# Patient Record
Sex: MALE | Race: OTHER | NOT HISPANIC OR LATINO
[De-identification: names, ages, dates, MRNs, and addresses within clinical notes are randomized per-mention and may not be internally consistent; named-entity substitution may affect disease eponyms.]

---

## 2019-08-30 PROBLEM — Z00.00 ENCOUNTER FOR PREVENTIVE HEALTH EXAMINATION: Status: ACTIVE | Noted: 2019-08-30

## 2019-09-12 ENCOUNTER — APPOINTMENT (OUTPATIENT)
Dept: CARDIOLOGY | Facility: CLINIC | Age: 79
End: 2019-09-12
Payer: MEDICAID

## 2019-09-12 VITALS
WEIGHT: 121 LBS | DIASTOLIC BLOOD PRESSURE: 62 MMHG | SYSTOLIC BLOOD PRESSURE: 160 MMHG | TEMPERATURE: 97.6 F | HEIGHT: 65 IN | RESPIRATION RATE: 16 BRPM | OXYGEN SATURATION: 97 % | HEART RATE: 64 BPM | BODY MASS INDEX: 20.16 KG/M2

## 2019-09-12 DIAGNOSIS — R94.31 ABNORMAL ELECTROCARDIOGRAM [ECG] [EKG]: ICD-10-CM

## 2019-09-12 DIAGNOSIS — R07.89 OTHER CHEST PAIN: ICD-10-CM

## 2019-09-12 DIAGNOSIS — Z82.3 FAMILY HISTORY OF STROKE: ICD-10-CM

## 2019-09-12 DIAGNOSIS — R00.2 PALPITATIONS: ICD-10-CM

## 2019-09-12 DIAGNOSIS — Z80.9 FAMILY HISTORY OF MALIGNANT NEOPLASM, UNSPECIFIED: ICD-10-CM

## 2019-09-12 PROCEDURE — 93306 TTE W/DOPPLER COMPLETE: CPT

## 2019-09-12 PROCEDURE — 99204 OFFICE O/P NEW MOD 45 MIN: CPT | Mod: 25

## 2019-09-12 PROCEDURE — 93015 CV STRESS TEST SUPVJ I&R: CPT

## 2019-09-15 PROBLEM — Z80.9 FAMILY HISTORY OF MALIGNANT NEOPLASM: Status: ACTIVE | Noted: 2019-09-15

## 2019-09-15 PROBLEM — R00.2 PALPITATIONS: Status: ACTIVE | Noted: 2019-09-12

## 2019-09-15 PROBLEM — Z82.3 FAMILY HISTORY OF CEREBROVASCULAR ACCIDENT (CVA): Status: ACTIVE | Noted: 2019-09-15

## 2019-09-15 NOTE — HISTORY OF PRESENT ILLNESS
[FreeTextEntry1] : 79 year-old male with HTN,DM, HLD with h/o stroke in 2011 and 2016  presents for evaluation of CP. Patient reports L sided CP for months that is not related to exertion and is not tender lasting seconds. Patient denies SOB. Patient reports palpitations lasting seconds. Patient had 2 previous strokes and has facial droop with slurred speech with no weakness and can walk slowly. Patient denies h/o syncope. Patient is on ASA,Lisinopril 20 mg, Norvasc 20 mg, Simvastatin 20 mg and Metformin 50 mg. I advised patient to undergo an echocardiogram and a treadmill stress test.

## 2019-09-15 NOTE — PHYSICAL EXAM
[General Appearance - Well Developed] : well developed [Normal Appearance] : normal appearance [Well Groomed] : well groomed [General Appearance - Well Nourished] : well nourished [No Deformities] : no deformities [General Appearance - In No Acute Distress] : no acute distress [Normal Conjunctiva] : the conjunctiva exhibited no abnormalities [Eyelids - No Xanthelasma] : the eyelids demonstrated no xanthelasmas [Normal Oral Mucosa] : normal oral mucosa [No Oral Pallor] : no oral pallor [No Oral Cyanosis] : no oral cyanosis [Normal Jugular Venous A Waves Present] : normal jugular venous A waves present [Normal Jugular Venous V Waves Present] : normal jugular venous V waves present [No Jugular Venous Hayes A Waves] : no jugular venous hayes A waves [Heart Rate And Rhythm] : heart rate and rhythm were normal [Heart Sounds] : normal S1 and S2 [Murmurs] : no murmurs present [Respiration, Rhythm And Depth] : normal respiratory rhythm and effort [Exaggerated Use Of Accessory Muscles For Inspiration] : no accessory muscle use [Auscultation Breath Sounds / Voice Sounds] : lungs were clear to auscultation bilaterally [Abdomen Soft] : soft [Abdomen Tenderness] : non-tender [Abdomen Mass (___ Cm)] : no abdominal mass palpated [Nail Clubbing] : no clubbing of the fingernails [Cyanosis, Localized] : no localized cyanosis [Petechial Hemorrhages (___cm)] : no petechial hemorrhages [] : no ischemic changes [Oriented To Time, Place, And Person] : oriented to person, place, and time [Affect] : the affect was normal [Mood] : the mood was normal [No Anxiety] : not feeling anxious

## 2019-09-20 ENCOUNTER — NON-APPOINTMENT (OUTPATIENT)
Age: 79
End: 2019-09-20

## 2019-09-25 ENCOUNTER — RESULT REVIEW (OUTPATIENT)
Age: 79
End: 2019-09-25

## 2020-03-05 RX ORDER — HYDROCHLOROTHIAZIDE 12.5 MG/1
12.5 CAPSULE ORAL
Qty: 15 | Refills: 5 | Status: ACTIVE | COMMUNITY
Start: 2019-09-12 | End: 1900-01-01

## 2021-10-24 PROBLEM — I10 HTN (HYPERTENSION): Status: ACTIVE | Noted: 2019-09-12

## 2021-10-24 NOTE — PHYSICAL EXAM
[General Appearance - Well Developed] : well developed [Normal Appearance] : normal appearance [Well Groomed] : well groomed [General Appearance - Well Nourished] : well nourished [No Deformities] : no deformities [General Appearance - In No Acute Distress] : no acute distress [Normal Conjunctiva] : the conjunctiva exhibited no abnormalities [Eyelids - No Xanthelasma] : the eyelids demonstrated no xanthelasmas [Normal Oral Mucosa] : normal oral mucosa [No Oral Pallor] : no oral pallor [No Oral Cyanosis] : no oral cyanosis [Normal Jugular Venous A Waves Present] : normal jugular venous A waves present [Normal Jugular Venous V Waves Present] : normal jugular venous V waves present [No Jugular Venous Hayes A Waves] : no jugular venous hayes A waves [Heart Rate And Rhythm] : heart rate and rhythm were normal [Heart Sounds] : normal S1 and S2 [Murmurs] : no murmurs present [Respiration, Rhythm And Depth] : normal respiratory rhythm and effort [Exaggerated Use Of Accessory Muscles For Inspiration] : no accessory muscle use [Auscultation Breath Sounds / Voice Sounds] : lungs were clear to auscultation bilaterally [Abdomen Tenderness] : non-tender [Abdomen Soft] : soft [Abdomen Mass (___ Cm)] : no abdominal mass palpated [Nail Clubbing] : no clubbing of the fingernails [Cyanosis, Localized] : no localized cyanosis [Petechial Hemorrhages (___cm)] : no petechial hemorrhages [] : no ischemic changes [Affect] : the affect was normal [Oriented To Time, Place, And Person] : oriented to person, place, and time [Mood] : the mood was normal [No Anxiety] : not feeling anxious

## 2021-10-25 ENCOUNTER — APPOINTMENT (OUTPATIENT)
Dept: CARDIOLOGY | Facility: CLINIC | Age: 81
End: 2021-10-25
Payer: MEDICARE

## 2021-10-25 VITALS
BODY MASS INDEX: 20.8 KG/M2 | RESPIRATION RATE: 16 BRPM | HEART RATE: 65 BPM | TEMPERATURE: 97.8 F | OXYGEN SATURATION: 97 % | WEIGHT: 125 LBS | DIASTOLIC BLOOD PRESSURE: 69 MMHG | SYSTOLIC BLOOD PRESSURE: 148 MMHG

## 2021-10-25 DIAGNOSIS — I10 ESSENTIAL (PRIMARY) HYPERTENSION: ICD-10-CM

## 2021-10-25 DIAGNOSIS — R06.02 SHORTNESS OF BREATH: ICD-10-CM

## 2021-10-25 PROCEDURE — 99214 OFFICE O/P EST MOD 30 MIN: CPT

## 2021-10-25 PROCEDURE — 93306 TTE W/DOPPLER COMPLETE: CPT

## 2021-10-28 NOTE — HISTORY OF PRESENT ILLNESS
[FreeTextEntry1] : 10/25/21 - Patient reports SOB with exertion. Patient denies CP. Patient denies palpitations. He has occasional dizziness seeing blurry. Patient has a murmur. His HR is slow but he does not have frequent episodes of lightheadedness. I advised patient's daughter that patient may need to wear Holter if symptoms are more frequent. His BP at home ranges from . He has Le edema.  I advised patient to undergo an echocardiogram. \par \par \par 9/12/19 - Patient reports L sided CP for months that is not related to exertion and is not tender lasting seconds. Patient denies SOB. Patient reports palpitations lasting seconds. Patient had 2 previous strokes and has facial droop with slurred speech with no weakness and can walk slowly. Patient denies h/o syncope. Patient is on ASA,Lisinopril 20 mg, Norvasc 20 mg, Simvastatin 20 mg and Metformin 50 mg. I advised patient to undergo an echocardiogram and a treadmill stress test.  Patient underwent a treadmill stress test and completed 4.5 minutes of Florentino protocol.  There were horizontal ST depressions on ECG but no symptoms.  Following treadmill stress, there was no echocardiographic evidence of ischemia.  Patient wore a Holter and it showed average HR of 63, APC's, PVC's, and short runs of PAT (6 beats the longest). No PAF noted.  His BP was elevated.  I advised patient to start HCTZ 12.5 mg QOD.

## 2021-10-28 NOTE — REASON FOR VISIT
[Symptom and Test Evaluation] : symptom and test evaluation [FreeTextEntry1] : 81 year-old male with HTN,DM, HLD with h/o stroke in 2011 and 2016  presents for followup. \par \par  Patient was last seen on 9/12/19 for evaluation of CP.  Patient underwent an echocardiogram and it showed normal LV function without significant valvular pathology. Patient underwent a treadmill stress test and completed 4.5 minutes of Florentino protocol.  There were horizontal ST depressions on ECG but no symptoms.  Following treadmill stress, there was no echocardiographic evidence of ischemia. Patient wore a Holter and it showed average HR of 63, APC's, PVC's, and short runs of PAT (6 beats the longest). No PAF noted.  His BP was elevated.  I advised patient to start HCTZ 12.5 mg QOD.\par \par Patient is on ASA,Lisinopril 20 mg, Norvasc 20 mg, Simvastatin 20 mg and Metformin 50 mg.

## 2024-10-21 ENCOUNTER — APPOINTMENT (OUTPATIENT)
Dept: CARDIOLOGY | Facility: CLINIC | Age: 84
End: 2024-10-21
Payer: MEDICARE

## 2024-10-21 VITALS
BODY MASS INDEX: 22.63 KG/M2 | HEART RATE: 66 BPM | OXYGEN SATURATION: 96 % | DIASTOLIC BLOOD PRESSURE: 71 MMHG | WEIGHT: 136 LBS | SYSTOLIC BLOOD PRESSURE: 128 MMHG

## 2024-10-21 DIAGNOSIS — I10 ESSENTIAL (PRIMARY) HYPERTENSION: ICD-10-CM

## 2024-10-21 PROCEDURE — 99214 OFFICE O/P EST MOD 30 MIN: CPT
